# Patient Record
Sex: FEMALE | Race: WHITE | NOT HISPANIC OR LATINO | Employment: FULL TIME | URBAN - METROPOLITAN AREA
[De-identification: names, ages, dates, MRNs, and addresses within clinical notes are randomized per-mention and may not be internally consistent; named-entity substitution may affect disease eponyms.]

---

## 2023-03-19 ENCOUNTER — HOSPITAL ENCOUNTER (EMERGENCY)
Facility: CLINIC | Age: 33
Discharge: HOME | End: 2023-03-19
Attending: EMERGENCY MEDICINE | Admitting: EMERGENCY MEDICINE
Payer: MEDICAID

## 2023-03-19 ENCOUNTER — APPOINTMENT (OUTPATIENT)
Dept: ULTRASOUND IMAGING | Facility: CLINIC | Age: 33
End: 2023-03-19
Attending: EMERGENCY MEDICINE
Payer: MEDICAID

## 2023-03-19 VITALS
SYSTOLIC BLOOD PRESSURE: 125 MMHG | HEART RATE: 83 BPM | TEMPERATURE: 98.6 F | OXYGEN SATURATION: 98 % | DIASTOLIC BLOOD PRESSURE: 90 MMHG | RESPIRATION RATE: 16 BRPM

## 2023-03-19 DIAGNOSIS — O21.9 NAUSEA AND VOMITING IN PREGNANCY: ICD-10-CM

## 2023-03-19 DIAGNOSIS — J06.9 UPPER RESPIRATORY TRACT INFECTION, UNSPECIFIED TYPE: ICD-10-CM

## 2023-03-19 DIAGNOSIS — O20.9 VAGINAL BLEEDING AFFECTING EARLY PREGNANCY: ICD-10-CM

## 2023-03-19 DIAGNOSIS — O20.0 THREATENED MISCARRIAGE IN EARLY PREGNANCY: ICD-10-CM

## 2023-03-19 LAB
ABO/RH(D): NORMAL
ANTIBODY SCREEN: NEGATIVE
BASOPHILS # BLD AUTO: 0.1 10E3/UL (ref 0–0.2)
BASOPHILS NFR BLD AUTO: 1 %
CLUE CELLS: ABNORMAL
EOSINOPHIL # BLD AUTO: 0.3 10E3/UL (ref 0–0.7)
EOSINOPHIL NFR BLD AUTO: 3 %
ERYTHROCYTE [DISTWIDTH] IN BLOOD BY AUTOMATED COUNT: 11.8 % (ref 10–15)
FLUAV RNA SPEC QL NAA+PROBE: NEGATIVE
FLUBV RNA RESP QL NAA+PROBE: NEGATIVE
HCG INTACT+B SERPL-ACNC: ABNORMAL MIU/ML
HCT VFR BLD AUTO: 40.7 % (ref 35–47)
HGB BLD-MCNC: 14.2 G/DL (ref 11.7–15.7)
IMM GRANULOCYTES # BLD: 0.1 10E3/UL
IMM GRANULOCYTES NFR BLD: 1 %
LYMPHOCYTES # BLD AUTO: 1.8 10E3/UL (ref 0.8–5.3)
LYMPHOCYTES NFR BLD AUTO: 20 %
MCH RBC QN AUTO: 30.1 PG (ref 26.5–33)
MCHC RBC AUTO-ENTMCNC: 34.9 G/DL (ref 31.5–36.5)
MCV RBC AUTO: 86 FL (ref 78–100)
MONOCYTES # BLD AUTO: 0.3 10E3/UL (ref 0–1.3)
MONOCYTES NFR BLD AUTO: 4 %
NEUTROPHILS # BLD AUTO: 6.2 10E3/UL (ref 1.6–8.3)
NEUTROPHILS NFR BLD AUTO: 71 %
NRBC # BLD AUTO: 0 10E3/UL
NRBC BLD AUTO-RTO: 0 /100
PLATELET # BLD AUTO: 247 10E3/UL (ref 150–450)
RBC # BLD AUTO: 4.72 10E6/UL (ref 3.8–5.2)
RSV RNA SPEC NAA+PROBE: NEGATIVE
SARS-COV-2 RNA RESP QL NAA+PROBE: NEGATIVE
SPECIMEN EXPIRATION DATE: NORMAL
TRICHOMONAS, WET PREP: ABNORMAL
WBC # BLD AUTO: 8.6 10E3/UL (ref 4–11)
WBC'S/HIGH POWER FIELD, WET PREP: ABNORMAL
YEAST, WET PREP: ABNORMAL

## 2023-03-19 PROCEDURE — 86850 RBC ANTIBODY SCREEN: CPT | Performed by: EMERGENCY MEDICINE

## 2023-03-19 PROCEDURE — 87591 N.GONORRHOEAE DNA AMP PROB: CPT | Performed by: EMERGENCY MEDICINE

## 2023-03-19 PROCEDURE — 36415 COLL VENOUS BLD VENIPUNCTURE: CPT | Performed by: EMERGENCY MEDICINE

## 2023-03-19 PROCEDURE — 99285 EMERGENCY DEPT VISIT HI MDM: CPT | Mod: CS,25

## 2023-03-19 PROCEDURE — 85004 AUTOMATED DIFF WBC COUNT: CPT | Performed by: EMERGENCY MEDICINE

## 2023-03-19 PROCEDURE — 84702 CHORIONIC GONADOTROPIN TEST: CPT | Performed by: EMERGENCY MEDICINE

## 2023-03-19 PROCEDURE — 86901 BLOOD TYPING SEROLOGIC RH(D): CPT | Performed by: EMERGENCY MEDICINE

## 2023-03-19 PROCEDURE — 87637 SARSCOV2&INF A&B&RSV AMP PRB: CPT | Performed by: EMERGENCY MEDICINE

## 2023-03-19 PROCEDURE — 76801 OB US < 14 WKS SINGLE FETUS: CPT

## 2023-03-19 PROCEDURE — 87491 CHLMYD TRACH DNA AMP PROBE: CPT | Performed by: EMERGENCY MEDICINE

## 2023-03-19 PROCEDURE — 87210 SMEAR WET MOUNT SALINE/INK: CPT | Performed by: EMERGENCY MEDICINE

## 2023-03-19 PROCEDURE — C9803 HOPD COVID-19 SPEC COLLECT: HCPCS

## 2023-03-19 RX ORDER — ONDANSETRON 4 MG/1
4-8 TABLET, FILM COATED ORAL EVERY 8 HOURS PRN
Qty: 18 TABLET | Refills: 0 | Status: SHIPPED | OUTPATIENT
Start: 2023-03-19

## 2023-03-19 ASSESSMENT — ENCOUNTER SYMPTOMS
DIARRHEA: 1
LIGHT-HEADEDNESS: 0
VOMITING: 1
ABDOMINAL PAIN: 0
FEVER: 0

## 2023-03-19 ASSESSMENT — ACTIVITIES OF DAILY LIVING (ADL)
ADLS_ACUITY_SCORE: 35
ADLS_ACUITY_SCORE: 35

## 2023-03-19 NOTE — DISCHARGE INSTRUCTIONS
"*Get plenty of rest and avoid strenuous activities.  *Tylenol for pain. Recommend B6 and doxylamine (12.5 mg by mouth at night - available over the counter as \"unisom\").  Zofran for additional relief of nausea.  *Follow-up with your OB/Gyn for a recheck within 1 week.  *Return to the ER if you develop fever, worsening pain, pain that moves to the right lower abdomen, faint or feel like you will faint or become worse in any way.    Discharge Instructions  Hyperemesis Gravidarum    You were seen today for hyperemesis gravidarum. It is very common to have some nausea and vomiting in early pregnancy (sometimes called  morning sickness,  although it happens at all times of day.) In hyperemesis gravidarum, however, the vomiting is much more severe, so you may become dehydrated and lose weight. We have treated you today and improved your symptoms, but they usually come back. You need to follow-up with your regular doctor or obstetrician within 1-2 days.    Return to the Emergency Department if:  You feel dizzy or light headed when standing up.  You are vomiting and can t keep fluids or medications down.  You urinate less often than usual and have dark yellow urine.   You feel much more ill or develop new symptoms.    What can I do to help myself?  Be sure to drink plenty of cold clear fluids (sports drinks and ginger ale) and suck on popsicles between meals.  Eat as soon as you feel hungry, or even before you feel hungry. Try to keep something on your stomach.   Avoid strong smells, or other things that make you feel nauseated.  Snack often and eat small meals high in protein or carbohydrates such as crackers, bread, nuts, and low-fat yogurt. Avoid spicy, greasy, or acid foods.  Avoid lying down right after you eat.  Take your prenatal vitamins at bedtime with a snack instead of taking them in the morning.  Ginger may make you feel better. Try eating a small piece of ginger, or having ginger-flavored hard " candies.  Acupressure wrist bands are also helpful to some people.    Treatment:  Nausea medication.  Your doctor may send you home with a prescription medicine, like Zofran  (ondansetron), Compazine  (prochlorperazine), or Reglan  (metoclopramide).     Your doctor may recommend that you take non-prescription nausea medicines, like Dramamine  (dimenhydrinate).    Vitamins. Make sure your prenatal vitamins have 400 micrograms of Folic acid and have vitamin B6, since this may help your nausea and vomiting.   If you were given a prescription for medicine here today, be sure to read all of the information (including the package insert) that comes with your prescription.  This will include important information about the medicine, its side effects, and any warnings that you need to know about.  The pharmacist who fills the prescription can provide more information and answer questions you may have about the medicine.  If you have questions or concerns that the pharmacist cannot address, please call or return to the Emergency Department.         Opioid Medication Information    Pain medications are among the most commonly prescribed medicines, so we are including this information for all our patients. If you did not receive pain medication or get a prescription for pain medicine, you can ignore it.     You may have been given a prescription for an opioid (narcotic) pain medicine and/or have received a pain medicine while here in the Emergency Department. These medicines can make you drowsy or impaired. You must not drive, operate dangerous equipment, or engage in any other dangerous activities while taking these medications. If you drive while taking these medications, you could be arrested for DUI, or driving under the influence. Do not drink any alcohol while you are taking these medications.     Opioid pain medications can cause addiction. If you have a history of chemical dependency of any type, you are at a higher  risk of becoming addicted to pain medications.  Only take these prescribed medications to treat your pain when all other options have been tried. Take it for as short a time and as few doses as possible. Store your pain pills in a secure place, as they are frequently stolen and provide a dangerous opportunity for children or visitors in your house to start abusing these powerful medications. We will not replace any lost or stolen medicine.  As soon as your pain is better, you should flush all your remaining medication.     Many prescription pain medications contain Tylenol  (acetaminophen), including Vicodin , Tylenol #3 , Norco , Lortab , and Percocet .  You should not take any extra pills of Tylenol  if you are using these prescription medications or you can get very sick.  Do not ever take more than 3000 mg of acetaminophen in any 24 hour period.    All opioids tend to cause constipation. Drink plenty of water and eat foods that have a lot of fiber, such as fruits, vegetables, prune juice, apple juice and high fiber cereal.  Take a laxative if you don t move your bowels at least every other day. Miralax , Milk of Magnesia, Colace , or Senna  can be used to keep you regular.      Remember that you can always come back to the Emergency Department if you are not able to see your regular doctor in the amount of time listed above, if you get any new symptoms, or if there is anything that worries you.

## 2023-03-19 NOTE — ED PROVIDER NOTES
History     Chief Complaint:  Vaginal Bleeding       HPI   Rupinder Minaya is a 32 year old  female who presents with vaginal bleeding. She reports that she had a positive pregnancy test 5 days ago at home and today has had spotting. Her last menstrual period was in 2022 and had a negative pregnancy test in 2023. She has had intermittent spotting since December. She had normal periods leading up to that but has had increased stress due to moving back to [place] from [place]. She does not currently have an OBGYN. This is her 4th pregnancy and has had one miscarriage. She has not needed rhogam with her prior pregnancies. She does note of some vomiting and diarrhea that she had during previous pregnancies. She denies lightheadedness, abdominal pain, fever, rash or urinary symptoms. She has concern for STIs. She also notes of a recent light cold starting 5 days ago. Denies any known sick contacts or recent travel.     Independent Historian:   None - Patient Only    Review of External Notes: None     ROS:  Review of Systems   Constitutional: Negative for fever.   Gastrointestinal: Positive for diarrhea and vomiting. Negative for abdominal pain.   Genitourinary: Positive for vaginal bleeding.   Skin: Negative for rash.   Neurological: Negative for light-headedness.   All other systems reviewed and are negative.      Allergies:  Penicillins     Medications:    The patient is not currently taking any prescribed medications.    Past Medical History:    CECY  Depression    Past Surgical History:    Tonsillectomy     Family History:    Mother - breast cancer    Social History:  The patient presents to the ED alone.  She is from [place] and just moved back from [place].  PCP: Isaiah Yee     Physical Exam     Patient Vitals for the past 24 hrs:   BP Temp Pulse Resp SpO2   23 1510 -- -- -- -- 98 %   23 1200 -- -- -- -- 99 %   23 1151 (!) 141/96 -- 102 -- --   23  1141 (!) 159/85 98.6  F (37  C) 92 18 98 %        Physical Exam  General: Well-nourished, appears to be resting comfortably when I enter the room  Eyes: PERRL, conjunctivae pink no scleral icterus or conjunctival injection  ENT:  Moist mucus membranes, posterior oropharynx clear without erythema or exudates  Respiratory:  Lungs clear to auscultation bilaterally, no crackles/rubs/wheezes.  Good air movement  CV: Normal rate and rhythm, no murmurs/rubs/gallops  GI:  Abdomen soft and non-distended.  Normoactive BS.  No tenderness, guarding or rebound  : Cervix closed, scant brown-tinged discharge, no CMT, no uterine/adnexal TTP.  Skin: Warm, dry.  No rashes or petechiae  Musculoskeletal: No peripheral edema or calf tenderness  Neuro: Alert and oriented to person/place/time  Psychiatric: Normal affect    Emergency Department Course     Imaging:  US OB <14 Weeks w Transvaginal   Final Result   IMPRESSION:    1.  Single living intrauterine gestation at 6 weeks 6 days, EDC 11/06/2023.   2.  Tiny amount of fluid or blood products within the cervical canal. No evidence of subchorionic hemorrhage.               Report per radiology    Laboratory:  Labs Ordered and Resulted from Time of ED Arrival to Time of ED Departure   HCG QUANTITATIVE PREGNANCY - Abnormal       Result Value    hCG Quantitative 34,915 (*)    WET PREPARATION - Abnormal    Trichomonas Absent      Yeast Absent      Clue Cells Absent      WBCs/high power field 1+ (*)    INFLUENZA A/B, RSV, & SARS-COV2 PCR - Normal    Influenza A PCR Negative      Influenza B PCR Negative      RSV PCR Negative      SARS CoV2 PCR Negative     CBC WITH PLATELETS AND DIFFERENTIAL    WBC Count 8.6      RBC Count 4.72      Hemoglobin 14.2      Hematocrit 40.7      MCV 86      MCH 30.1      MCHC 34.9      RDW 11.8      Platelet Count 247      % Neutrophils 71      % Lymphocytes 20      % Monocytes 4      % Eosinophils 3      % Basophils 1      % Immature Granulocytes 1      NRBCs  per 100 WBC 0      Absolute Neutrophils 6.2      Absolute Lymphocytes 1.8      Absolute Monocytes 0.3      Absolute Eosinophils 0.3      Absolute Basophils 0.1      Absolute Immature Granulocytes 0.1      Absolute NRBCs 0.0     TYPE AND SCREEN, ADULT    ABO/RH(D) B POS      Antibody Screen Negative      SPECIMEN EXPIRATION DATE [id number]     CHLAMYDIA TRACHOMATIS PCR   NEISSERIA GONORRHOEAE PCR   ABO/RH TYPE AND SCREEN          Emergency Department Course & Assessments:     Assessments:  1224 I obtained history and examined the patient as noted above.  1229 I performed chaperoned pelvic exam.   1505 I rechecked the patient and explained findings.    Independent Interpretation (X-rays, CTs, rhythm strip):  None    Consultations/Discussion of Management or Tests:  None        Social Determinants of Health affecting care:   Stress/Adjustment Disorders    Disposition:  The patient was discharged to home.     Impression & Plan      Medical Decision Making:  Rupinder Minaya is a 32 year old female who presents for evaluation of vaginal bleeding and a recently discovered pregnancy.  She also has some URI symptoms and request COVID testing.  From the perspective of the URI, she has clear lungs, normal heart rate, normal oxygenation and her viral testing for COVID, influenza and RSV was negative.  The workup here shows threatened miscarriage.  There is no apparent subchorionic hemorrhage.  The differential diagnosis of abdominal pain in a pregnant female is broad and includes common etiologies such ovarian cyst, UTI, pyelonephritis, subchorionic hemorrhage, uterine bleeding, active miscarriage, constipation, etc.  More rare but serious etiologies considered included ectopic pregnancy, appendicitis, cholecystitis, volvulus, intraabdominal abscess, heterotopic pregnancy, etc.  In this patient, there are no signs of serious etiologies of abdominal pain.  Supportive outpatient management is therefore indicated.  Plan is  home, close follow-up with OB, threatened miscarriage precautions, and return to ED for worsening pain, heavy vaginal bleeding (more than 1 pad soaked every hour).  Questions were answered.      Diagnosis:    ICD-10-CM    1. Threatened miscarriage in early pregnancy  O20.0       2. Vaginal bleeding affecting early pregnancy  O20.9       3. Upper respiratory tract infection, unspecified type  J06.9       4. Nausea and vomiting in pregnancy  O21.9            Discharge Medications:  New Prescriptions    ONDANSETRON (ZOFRAN) 4 MG TABLET    Take 1-2 tablets (4-8 mg) by mouth every 8 hours as needed for nausea          Scribe Disclosure:  Leta GONSALEZ, am serving as a scribe at 11:45 AM on 3/19/2023 to document services personally performed by Linh Denson MD based on my observations and the provider's statements to me.     3/19/2023   Linh Denson MD Cho, Amy C, MD  03/19/23 7747

## 2023-03-19 NOTE — ED TRIAGE NOTES
Pt states she is having vaginal bleeding that strated this morning and last menstrual period was in December 2022. Positive urine test at home and resulted as positive.

## 2023-03-20 LAB
C TRACH DNA SPEC QL NAA+PROBE: NEGATIVE
N GONORRHOEA DNA SPEC QL NAA+PROBE: NEGATIVE

## 2023-06-16 ENCOUNTER — APPOINTMENT (OUTPATIENT)
Dept: GENERAL RADIOLOGY | Facility: CLINIC | Age: 33
End: 2023-06-16
Attending: EMERGENCY MEDICINE
Payer: COMMERCIAL

## 2023-06-16 ENCOUNTER — HOSPITAL ENCOUNTER (EMERGENCY)
Facility: CLINIC | Age: 33
Discharge: HOME | End: 2023-06-16
Attending: EMERGENCY MEDICINE | Admitting: EMERGENCY MEDICINE
Payer: COMMERCIAL

## 2023-06-16 VITALS
HEART RATE: 95 BPM | RESPIRATION RATE: 18 BRPM | SYSTOLIC BLOOD PRESSURE: 138 MMHG | OXYGEN SATURATION: 95 % | TEMPERATURE: 97.9 F | DIASTOLIC BLOOD PRESSURE: 95 MMHG

## 2023-06-16 DIAGNOSIS — S93.409A SPRAIN OF ANKLE, UNSPECIFIED LATERALITY, UNSPECIFIED LIGAMENT, INITIAL ENCOUNTER: ICD-10-CM

## 2023-06-16 PROCEDURE — 250N000013 HC RX MED GY IP 250 OP 250 PS 637: Performed by: EMERGENCY MEDICINE

## 2023-06-16 PROCEDURE — 73610 X-RAY EXAM OF ANKLE: CPT | Mod: LT

## 2023-06-16 PROCEDURE — 99283 EMERGENCY DEPT VISIT LOW MDM: CPT

## 2023-06-16 RX ORDER — HYDROCODONE BITARTRATE AND ACETAMINOPHEN 5; 325 MG/1; MG/1
2 TABLET ORAL ONCE
Status: COMPLETED | OUTPATIENT
Start: 2023-06-16 | End: 2023-06-16

## 2023-06-16 RX ORDER — IBUPROFEN 600 MG/1
600 TABLET, FILM COATED ORAL ONCE
Status: COMPLETED | OUTPATIENT
Start: 2023-06-16 | End: 2023-06-16

## 2023-06-16 RX ADMIN — HYDROCODONE BITARTRATE AND ACETAMINOPHEN 2 TABLET: 5; 325 TABLET ORAL at 11:24

## 2023-06-16 RX ADMIN — IBUPROFEN 600 MG: 600 TABLET ORAL at 10:45

## 2023-06-16 ASSESSMENT — ACTIVITIES OF DAILY LIVING (ADL): ADLS_ACUITY_SCORE: 35

## 2023-06-16 NOTE — ED PROVIDER NOTES
History     Chief Complaint:  Ankle Pain       HPI   Rupinder Minaya is a 32 year old female with a fall while walking.  This injury occurred last evening.  She is unable to put any weight on her left foot.  No other injuries.      Independent Historian:   None - Patient Only    Review of External Notes:   I reviewed clinic notes from [facility] urgent care [place]    Medications:    ibuprofen (ADVIL,MOTRIN) 200 MG tablet  ondansetron (ZOFRAN) 4 MG tablet  oxyCODONE-acetaminophen (PERCOCET) 5-325 MG per tablet  TYLENOL 325 MG OR TABS        Past Medical History:    Past Medical History:   Diagnosis Date     Depressive disorder        Past Surgical History:    History reviewed. No pertinent surgical history.     Physical Exam     Patient Vitals for the past 24 hrs:   BP Temp Temp src Pulse Resp SpO2   06/16/23 1011 (!) 138/95 97.9  F (36.6  C) Temporal 95 18 95 %        Physical Exam  General: Alert, No distress. Nontoxic appearance  Head: No signs of trauma.   Mouth/Throat: Oropharynx moist.   Eyes: Conjunctivae are normal. Pupils are equal..   Neck: Normal range of motion.    CV: Appears well perfused.  Resp:No respiratory distress.   MSK: Normal range of motion. No obvious deformity.  She has pain over the left lateral malleolus.  No pain through the left foot.  No proximal fibular tenderness.  Neuro: The patient is alert and interactive. GARNER. Speech normal. GCS 15  Skin: No lesion or sign of trauma noted.   Psych: normal mood and affect. behavior is normal.       Emergency Department Course     Imaging:  XR Ankle Left G/E 3 Views   Final Result   IMPRESSION:    Normal joint spaces and alignment. No fracture.    Intact ankle mortise.      MARILYN PROCTOR MD            SYSTEM ID:  [id number]           Laboratory:  Labs Ordered and Resulted from Time of ED Arrival to Time of ED Departure - No data to display     Procedures   The patient was placed in orthotic boot and given crutches to help ambulate with  minimal weightbearing.    Emergency Department Course & Assessments:  Interventions:  Medications   ibuprofen (ADVIL/MOTRIN) tablet 600 mg (600 mg Oral $Given 6/16/23 1045)   HYDROcodone-acetaminophen (NORCO) 5-325 MG per tablet 2 tablet (2 tablets Oral $Given 6/16/23 1124)      Assessments:  The patient's progress was assessed multiple times while in the emergency department    Independent Interpretation (X-rays, CTs, rhythm strip):  X-ray of the ankle shows no evidence of fracture    Consultations/Discussion of Management or Tests:  None   ED Course as of 06/16/23 2101 Fri Jun 16, 2023   1034 XR Ankle Left G/E 3 Views       Social Determinants of Health affecting care:   Stress/Adjustment Disorders    Disposition:  The patient was discharged to home.     Impression & Plan      Medical Decision Making:  Rupinder Minaya is a 32 year old female who presents for evaluation of ankle pain.  Signs and symptoms are consistent with an ankle sprain.  This involves 3 ligaments of the lateral ankle by clinical exam. There are no signs of fracture.  The patients neurovascular status is normal. A head to toe trauma exam is otherwise negative; the likelihood of other serious sequelae of trauma (spine, head, chest, abdomen, other extremities, pelvis) is low.  Plan is for protected weightbearing, RICE treatment with ice 15 minutes on, 1 hour off, ace wrap.  Patient will advance weightbearing and follow-up with primary in 2-3 days.  They will begin gentle ROM exercises of the ankle including PF,DF, alphabet exercises.     Diagnosis:    ICD-10-CM    1. Sprain of ankle, unspecified laterality, unspecified ligament, initial encounter  S93.409A Ankle/Foot Bracing Supplies DME Walking Boot; Left; Non-pneumatic           Discharge Medications:  New Prescriptions    No medications on file        Roshan Oro MD Joing, Todd Roger, MD  06/16/23 2103

## 2023-06-16 NOTE — LETTER
June 16, 2023      To Whom It May Concern:      Rupinder Minaya was seen in our Emergency Department today, 06/16/23.  I expect her condition to improve over the next 3 days.  She may return to work/school when improved.    Sincerely,        Billie GARAY RN

## 2023-06-16 NOTE — ED TRIAGE NOTES
Left ankle pain - pt walking fell denies any other injuries - pt unable to place any weight on left foot ankle     Triage Assessment     Row Name 06/16/23 1013       Triage Assessment (Adult)    Airway WDL WDL       Respiratory WDL    Respiratory WDL WDL       Cardiac WDL    Cardiac WDL WDL       Cognitive/Neuro/Behavioral WDL    Cognitive/Neuro/Behavioral WDL WDL

## 2023-06-16 NOTE — ED NOTES
Pt given crutches , pt stated they know how to use them . Pt stated that she is a  - 3 day work note given . Md aware.

## 2023-08-31 ENCOUNTER — OFFICE VISIT (OUTPATIENT)
Dept: URGENT CARE | Facility: URGENT CARE | Age: 33
End: 2023-08-31
Payer: COMMERCIAL

## 2023-08-31 VITALS
HEART RATE: 99 BPM | DIASTOLIC BLOOD PRESSURE: 101 MMHG | SYSTOLIC BLOOD PRESSURE: 138 MMHG | OXYGEN SATURATION: 97 % | BODY MASS INDEX: 38.69 KG/M2 | WEIGHT: 247 LBS | TEMPERATURE: 98.3 F

## 2023-08-31 DIAGNOSIS — Z76.0 ENCOUNTER FOR MEDICATION REFILL: Primary | ICD-10-CM

## 2023-08-31 PROCEDURE — 99203 OFFICE O/P NEW LOW 30 MIN: CPT | Performed by: PHYSICIAN ASSISTANT

## 2023-08-31 RX ORDER — PAROXETINE 40 MG/1
40 TABLET, FILM COATED ORAL DAILY
COMMUNITY

## 2023-08-31 RX ORDER — PAROXETINE 10 MG/1
20 TABLET, FILM COATED ORAL EVERY MORNING
Qty: 90 TABLET | Refills: 0 | Status: SHIPPED | OUTPATIENT
Start: 2023-08-31

## 2023-08-31 ASSESSMENT — ENCOUNTER SYMPTOMS
DYSPHORIC MOOD: 1
SHORTNESS OF BREATH: 0
SLEEP DISTURBANCE: 0

## 2023-08-31 NOTE — PROGRESS NOTES
SUBJECTIVE:   Rupinder Minaya is a 32 year old female presenting with a chief complaint of   Chief Complaint   Patient presents with    Medication Request     Just moved back to [place] in Dec. Had refills from PCP in [place]- ran out of refills, trying to get a new PCP set up locally, has since run out of meds and refills on day 4 w/o, would like a refill until able to be seen Paroxetine 40mg       She is a new patient of [place].  Patient presents requesting RF on paroxetine.  She states she was on 40 mg daily.  She states she has been off of medications for 4 days.  Denies suicidal, homicidal or self injury thoughts.  She has an appointment with PCP coming up and states she cannot wait.  No sleep disturbances.          Review of Systems   Respiratory:  Negative for shortness of breath.    Cardiovascular:  Negative for chest pain.   Psychiatric/Behavioral:  Positive for dysphoric mood. Negative for self-injury, sleep disturbance and suicidal ideas.    All other systems reviewed and are negative.      Past Medical History:   Diagnosis Date    Depressive disorder      No family history on file.  Current Outpatient Medications   Medication Sig Dispense Refill    ibuprofen (ADVIL,MOTRIN) 200 MG tablet Take 4 tablets (800 mg) by mouth every 8 hours as needed for pain 1 tablet 0    PARoxetine (PAXIL) 10 MG tablet Take 2 tablets (20 mg) by mouth every morning 90 tablet 0    PARoxetine (PAXIL) 40 MG tablet Take 40 mg by mouth daily      TYLENOL 325 MG OR TABS 2 TABLETS EVERY 4 HOURS AS NEEDED      ondansetron (ZOFRAN) 4 MG tablet Take 1-2 tablets (4-8 mg) by mouth every 8 hours as needed for nausea (Patient not taking: Reported on 8/31/2023) 18 tablet 0    oxyCODONE-acetaminophen (PERCOCET) 5-325 MG per tablet Take 1-2 tablets by mouth every 6 hours as needed for pain (Patient not taking: Reported on 8/31/2023) 15 tablet 0     Social History     Tobacco Use    Smoking status: Never     Passive exposure: Yes    Smokeless tobacco:  Not on file    Tobacco comments:     grandparents smoke and they live with pt   Substance Use Topics    Alcohol use: Yes       OBJECTIVE  BP (!) 138/101   Pulse 99   Temp 98.3  F (36.8  C) (Tympanic)   Wt 112 kg (247 lb)   LMP 08/26/2023 (Approximate)   SpO2 97%   BMI 38.69 kg/m      Physical Exam  Vitals and nursing note reviewed.   Constitutional:       General: She is not in acute distress.     Appearance: Normal appearance. She is obese. She is not ill-appearing.   Eyes:      Extraocular Movements: Extraocular movements intact.      Conjunctiva/sclera: Conjunctivae normal.   Cardiovascular:      Rate and Rhythm: Normal rate and regular rhythm.      Pulses: Normal pulses.      Heart sounds: Normal heart sounds.   Pulmonary:      Effort: Pulmonary effort is normal.      Breath sounds: Normal breath sounds.   Skin:     General: Skin is warm and dry.   Neurological:      General: No focal deficit present.      Mental Status: She is alert.   Psychiatric:         Mood and Affect: Mood normal.         Behavior: Behavior normal.         Thought Content: Thought content normal.         Judgment: Judgment normal.         Labs:  No results found for this or any previous visit (from the past 24 hour(s)).    X-Ray was not done.    ASSESSMENT:      ICD-10-CM    1. Encounter for medication refill  Z76.0 PARoxetine (PAXIL) 10 MG tablet           Medical Decision Making:    Differential Diagnosis:  Medication refill    Serious Comorbid Conditions:  Adult:   reviewed    PLAN:    RF on paroxetine - 10 mg tablets.  Patient to take one tablet daily, increasing every 3-4 days until at 40 mg daily.  Discussed reasons to seek immediate medical attention.  Additionally if no improvement or worsening in one week, may follow up with PCP and/or UC.        Followup:    If not improving or if condition worsens, follow up with your Primary Care Provider, If not improving or if conditions worsens over the next 12-24 hours, go to the  Emergency Department    There are no Patient Instructions on file for this visit.

## 2023-12-04 NOTE — TELEPHONE ENCOUNTER
Pharmacy seeking refill of paroxetine 10mg, Rx'd at     Seen at  8/31/2023, per OV notes:  She is a new patient of [facility].  Patient presents requesting RF on paroxetine.  She states she was on 40 mg daily.  She states she has been off of medications for 4 days.  Denies suicidal, homicidal or self injury thoughts.  She has an appointment with PCP coming up and states she cannot wait.  No sleep disturbances.     Fax sent back to pharmacy - contact new PCP for refill;  does not provide refills.    Amanda Rojas RT (R)

## 2024-08-03 ENCOUNTER — OFFICE VISIT (OUTPATIENT)
Dept: URGENT CARE | Facility: URGENT CARE | Age: 34
End: 2024-08-03
Payer: COMMERCIAL

## 2024-08-03 VITALS
OXYGEN SATURATION: 98 % | HEART RATE: 98 BPM | DIASTOLIC BLOOD PRESSURE: 85 MMHG | TEMPERATURE: 98.8 F | SYSTOLIC BLOOD PRESSURE: 128 MMHG | WEIGHT: 243.4 LBS | RESPIRATION RATE: 19 BRPM

## 2024-08-03 DIAGNOSIS — L03.317 CELLULITIS OF BUTTOCK, RIGHT: ICD-10-CM

## 2024-08-03 DIAGNOSIS — L02.01 FACIAL ABSCESS: Primary | ICD-10-CM

## 2024-08-03 PROCEDURE — 99213 OFFICE O/P EST LOW 20 MIN: CPT | Performed by: NURSE PRACTITIONER

## 2024-08-03 RX ORDER — BUSPIRONE HYDROCHLORIDE 10 MG/1
TABLET ORAL
COMMUNITY
Start: 2023-12-04

## 2024-08-03 RX ORDER — SULFAMETHOXAZOLE/TRIMETHOPRIM 800-160 MG
1 TABLET ORAL 2 TIMES DAILY
Qty: 20 TABLET | Refills: 0 | Status: SHIPPED | OUTPATIENT
Start: 2024-08-03 | End: 2024-08-13

## 2024-08-03 NOTE — PROGRESS NOTES
Chief Complaint   Patient presents with    Urgent Care     Pt present with painful cyst on R side of face and R bum, onset 3 days.          ICD-10-CM    1. Facial abscess  L02.01 sulfamethoxazole-trimethoprim (BACTRIM DS) 800-160 MG tablet      2. Cellulitis of buttock, right  L03.317 sulfamethoxazole-trimethoprim (BACTRIM DS) 800-160 MG tablet      Not ready for incision and drainage at this time.  Warm wet compresses follow-up with dermatology for these recurrent issues for possible antibiotics as prescribed.  Recheck in 10 days if symptoms have not improved, sooner if they worsen.  Prophylactic treatment.    Red flag warning signs and when to go to the emergency room discussed.  Reviewed potential adverse reactions to medications.      Subjective     Rupinder Minaya is an 33 year old female who presents to clinic today for red bump on the right cheek for 3 days, and on right buttock that started 2 days ago. She green shave a history of cysts in the past.       ROS: 10 point ROS neg other than the symptoms noted above in the HPI.       Objective    /85   Pulse 98   Temp 98.8  F (37.1  C) (Tympanic)   Resp 19   Wt 110.4 kg (243 lb 6.4 oz)   SpO2 98%   Nurses notes and VS have been reviewed.    Physical Exam       GENERAL APPEARANCE: healthy appearing, alert     EYES: PERRL, EOMI, sclera non-icteric     HENT: oral exam benign, mucus membranes intact, without ulcers or lesions     NECK: no adenopathy or asymmetry, thyroid normal to palpation     MS: extremities normal- no gross deformities noted; normal muscle tone.     SKIN: Just in front of the right ear along the jawline there is a 2 cm area of erythema and induration, hot to the touch with a central scab, right buttock has a 3 cm area of erythema that is firm to the touch but not swollen at this time, central papule in this area    Dolly Lora, VIKY, CNP  [facility] Provider    The use of Dragon/[employer] dictation services may have been used  to construct the content in this note; any grammatical or spelling errors are non-intentional. Please contact the author of this note directly if you are in need of any clarification.